# Patient Record
Sex: MALE | Race: WHITE | Employment: STUDENT | URBAN - METROPOLITAN AREA
[De-identification: names, ages, dates, MRNs, and addresses within clinical notes are randomized per-mention and may not be internally consistent; named-entity substitution may affect disease eponyms.]

---

## 2020-09-11 ENCOUNTER — OFFICE VISIT (OUTPATIENT)
Dept: PSYCHOLOGY | Age: 20
End: 2020-09-11
Payer: COMMERCIAL

## 2020-09-11 PROCEDURE — 99203 OFFICE O/P NEW LOW 30 MIN: CPT | Performed by: PSYCHIATRY & NEUROLOGY

## 2020-09-11 RX ORDER — FLUVOXAMINE MALEATE 100 MG
100 TABLET ORAL NIGHTLY
Qty: 30 TABLET | Refills: 0 | Status: SHIPPED
Start: 2020-09-11 | End: 2020-09-25 | Stop reason: SDUPTHER

## 2020-09-11 RX ORDER — ARIPIPRAZOLE 5 MG/1
5 TABLET ORAL NIGHTLY
Qty: 30 TABLET | Refills: 2 | Status: SHIPPED
Start: 2020-09-11 | End: 2020-12-18 | Stop reason: SDUPTHER

## 2020-09-11 NOTE — PROGRESS NOTES
program at [employer] 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     SUBSTANCE ABUSE HISTORY: Denies. MENTAL STATUS EXAM: White male appears age. Pleasant, cooperative, forthcoming. Normal psychomotor activity, gait, strength, tone, eye contact. Mood euthymic. Affect flexible. Speech clear. Thoughts organized. Content future-oriented. Obsessions noted. No compulsions. No paranoia, delusions, hallucinations. No suicidal or homicidal ideations. Orientation, concentration, recent and remote memory are grossly intact. Fund of knowledge fair. Language use fair. Insight and judgment fair. MEDICATIONS:  Luvox 100 mg nightly (increasing)     Abilify 5 mg nightly (continuing)     ASSESSMENT:  OCD      Mood Disorder    PLAN: Support, reassurance and psycho-education provided. Optimize Luvox. Patient not interested in resuming psychotherapy at this time. Continue to monitor symptoms, side effects and response to medications. Adjust treatment accordingly. See back two weeks.        Signed:  Electronically signed by Nathalie Wilson MD on 9/11/2020 at 9:39 AM

## 2020-09-25 ENCOUNTER — OFFICE VISIT (OUTPATIENT)
Dept: PSYCHOLOGY | Age: 20
End: 2020-09-25

## 2020-09-25 PROCEDURE — 99212 OFFICE O/P EST SF 10 MIN: CPT | Performed by: PSYCHIATRY & NEUROLOGY

## 2020-09-25 RX ORDER — FLUVOXAMINE MALEATE 100 MG
100 TABLET ORAL NIGHTLY
Qty: 30 TABLET | Refills: 0 | Status: SHIPPED
Start: 2020-09-25 | End: 2020-10-26 | Stop reason: SDUPTHER

## 2020-10-26 ENCOUNTER — OFFICE VISIT (OUTPATIENT)
Dept: PSYCHOLOGY | Age: 20
End: 2020-10-26

## 2020-10-26 PROCEDURE — 99213 OFFICE O/P EST LOW 20 MIN: CPT | Performed by: PSYCHIATRY & NEUROLOGY

## 2020-10-26 RX ORDER — FLUVOXAMINE MALEATE 100 MG
100 TABLET ORAL NIGHTLY
Qty: 30 TABLET | Refills: 2 | Status: SHIPPED
Start: 2020-10-26 | End: 2020-12-18 | Stop reason: SDUPTHER

## 2020-10-26 NOTE — PROGRESS NOTES
PSYCHIATRY ATTENDING NOTE    S: Patient being seen at [facility] in follow-up for OCD and mood disorder. No medication changes made last appointment. Met with patient today to discuss progress with treatment. Krishna Still reports things are going well overall. 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 Reports OCD symptoms well controlled with Luvox. Mood has been stable. Functioning well. No side effects. Eating and sleeping well. No issues otherwise. MSE: Kaden Ghotra male appears age. Pleasant, cooperative, forthcoming. Normal psychomotor activity, gait, strength, tone, eye contact. Mood euthymic. Affect flexible. Speech clear. Thoughts organized. Content future-oriented. No paranoia, delusions, hallucinations. No suicidal or homicidal ideations. Orientation, concentration, recent and remote memory are grossly intact. Fund of knowledge fair. Language use fair. Insight and judgment fair. MEDICATIONS:  Luvox 100 mg nightly (continuing)                                      Abilify 5 mg nightly (continuing)    ASSESSMENT:  OCD     Mood Disorder    PLAN: Patient doing well. Continue current treatment. Support provided. See back 8 weeks.                            Electronically signed by Steffany Ge MD on 10/26/2020 at 9:01 AM

## 2020-12-18 ENCOUNTER — OFFICE VISIT (OUTPATIENT)
Dept: PSYCHOLOGY | Age: 20
End: 2020-12-18

## 2020-12-18 PROCEDURE — 99212 OFFICE O/P EST SF 10 MIN: CPT | Performed by: PSYCHIATRY & NEUROLOGY

## 2020-12-18 RX ORDER — FLUVOXAMINE MALEATE 100 MG
100 TABLET ORAL NIGHTLY
Qty: 30 TABLET | Refills: 2 | Status: SHIPPED
Start: 2020-12-18 | End: 2021-02-09 | Stop reason: SDUPTHER

## 2020-12-18 RX ORDER — ARIPIPRAZOLE 5 MG/1
5 TABLET ORAL NIGHTLY
Qty: 30 TABLET | Refills: 2 | Status: SHIPPED
Start: 2020-12-18 | End: 2021-03-08 | Stop reason: SDUPTHER

## 2020-12-18 NOTE — PROGRESS NOTES
PSYCHIATRY ATTENDING NOTE    S: Patient being seen at [facility] in follow-up for OCD and mood disorder. No medication changes made last appointment. Met with patient today to discuss progress with treatment. Stanislaw Samayoa reports things are going well overall. He finished the semester with all A's and one B. Reports OCD symptoms are well controlled. Mood has been stable. Functioning well. No side effects. Eating and sleeping well. No issues otherwise. MSE: Ricardo Foster male appears age. Pleasant, cooperative, forthcoming. Normal psychomotor activity, gait, strength, tone, eye contact. Mood euthymic. Affect flexible. Speech clear. Thoughts organized. Content future-oriented. No paranoia, delusions, hallucinations. No suicidal or homicidal ideations. Orientation, concentration, recent and remote memory are grossly intact. Fund of knowledge fair. Language use fair. Insight and judgment fair. MEDICATIONS:  Luvox 100 mg nightly (continuing)                                      Abilify 5 mg nightly (continuing)    ASSESSMENT:  OCD     Mood Disorder    PLAN: Patient doing well. Continue current treatment. Support provided. See back 12 weeks.                            Electronically signed by Ryan Orellana MD on 12/18/2020 at 9:01 AM

## 2021-02-09 RX ORDER — FLUVOXAMINE MALEATE 100 MG
100 TABLET ORAL NIGHTLY
Qty: 30 TABLET | Refills: 2 | Status: SHIPPED
Start: 2021-02-09 | End: 2021-03-08 | Stop reason: SDUPTHER

## 2021-02-09 RX ORDER — FLUVOXAMINE MALEATE 100 MG
TABLET ORAL
COMMUNITY
Start: 2020-10-26 | End: 2021-02-09 | Stop reason: SDUPTHER

## 2021-03-08 ENCOUNTER — OFFICE VISIT (OUTPATIENT)
Dept: PSYCHOLOGY | Age: 21
End: 2021-03-08

## 2021-03-08 DIAGNOSIS — F42.9 OBSESSIVE-COMPULSIVE DISORDER, UNSPECIFIED TYPE: Primary | ICD-10-CM

## 2021-03-08 PROCEDURE — 99212 OFFICE O/P EST SF 10 MIN: CPT | Performed by: PSYCHIATRY & NEUROLOGY

## 2021-03-08 RX ORDER — ARIPIPRAZOLE 5 MG/1
5 TABLET ORAL NIGHTLY
Qty: 30 TABLET | Refills: 2 | Status: SHIPPED
Start: 2021-03-08 | End: 2021-06-25 | Stop reason: SDUPTHER

## 2021-03-08 RX ORDER — FLUVOXAMINE MALEATE 100 MG
100 TABLET ORAL NIGHTLY
Qty: 30 TABLET | Refills: 2 | Status: SHIPPED
Start: 2021-03-08 | End: 2021-06-23 | Stop reason: SDUPTHER

## 2021-03-08 NOTE — PROGRESS NOTES
PSYCHIATRY ATTENDING NOTE    S: Patient being seen at [facility] in follow-up for OCD and mood disorder. No medication changes made last appointment. Met with patient today to discuss progress with treatment. Mariluz Mederos reports he is doing very well. 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 His classes this semester are going well. Reports OCD symptoms are well controlled. Mood has been stable. Tolerating medications without incident. Eating and sleeping well. No issues otherwise. MSE: Shanw male appears age. Pleasant, cooperative, forthcoming. Normal psychomotor activity, gait, strength, tone, eye contact. Mood euthymic. Affect flexible. Speech clear. Thoughts organized. Content future-oriented. No paranoia, delusions, hallucinations. No suicidal or homicidal ideations. Orientation, concentration, recent and remote memory are grossly intact. Fund of knowledge fair. Language use fair. Insight and judgment fair. MEDICATIONS:  Luvox 100 mg nightly (continuing)                                      Abilify 5 mg nightly (continuing)    ASSESSMENT:  OCD     Mood Disorder    PLAN: Continue same treatment. See back 3 months.                           Electronically signed by Ryan Zhang MD on 3/8/2021 at 9:06 AM

## 2021-06-22 RX ORDER — FLUVOXAMINE MALEATE 100 MG
100 TABLET ORAL NIGHTLY
Qty: 30 TABLET | Refills: 0 | OUTPATIENT
Start: 2021-06-22

## 2021-06-22 NOTE — TELEPHONE ENCOUNTER
Patient has been out of his medication for the past 2 days and is in need of refill. Patient has follow up appointment on Friday, June 25.

## 2021-06-23 RX ORDER — FLUVOXAMINE MALEATE 100 MG
100 TABLET ORAL NIGHTLY
Qty: 30 TABLET | Refills: 2 | Status: SHIPPED
Start: 2021-06-23 | End: 2021-09-27 | Stop reason: SDUPTHER

## 2021-06-25 ENCOUNTER — OFFICE VISIT (OUTPATIENT)
Dept: PSYCHOLOGY | Age: 21
End: 2021-06-25

## 2021-06-25 DIAGNOSIS — F42.9 OBSESSIVE-COMPULSIVE DISORDER, UNSPECIFIED TYPE: Primary | ICD-10-CM

## 2021-06-25 PROCEDURE — 99212 OFFICE O/P EST SF 10 MIN: CPT | Performed by: PSYCHIATRY & NEUROLOGY

## 2021-06-25 RX ORDER — ARIPIPRAZOLE 5 MG/1
5 TABLET ORAL NIGHTLY
Qty: 30 TABLET | Refills: 3 | Status: SHIPPED
Start: 2021-06-25 | End: 2021-09-27 | Stop reason: SDUPTHER

## 2021-06-25 NOTE — PROGRESS NOTES
PSYCHIATRY ATTENDING NOTE    S: Patient being seen at [facility] in follow-up for OCD and mood disorder. No medication changes made last appointment. Met with patient today to discuss progress with treatment. Lauren Welch reports he is doing very well. He finished last semester with two B's and the rest A's. 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 Lauren Welch is looking forward to going to [place] in August for vacation. He is compliant with the medications and tolerating without incident. OCD symptoms are well controlled. Mood stable. No issues. MSE: [facility] male appears age. Pleasant, cooperative, forthcoming. Normal psychomotor activity, gait, strength, tone, eye contact. Mood euthymic. Affect flexible. Speech clear. Thoughts organized. Content future-oriented. No paranoia, delusions, hallucinations. No suicidal or homicidal ideations. Orientation, concentration, recent and remote memory are grossly intact. Fund of knowledge fair. Language use fair. Insight and judgment fair. MEDICATIONS:  Luvox 100 mg nightly (continuing)                                      Abilify 5 mg nightly (continuing)    ASSESSMENT:  OCD     Mood Disorder    PLAN: Continue same treatment. See back 3 months.                           Electronically signed by Derrick Santos MD on 6/25/2021 at 9:07 AM

## 2021-09-27 ENCOUNTER — OFFICE VISIT (OUTPATIENT)
Dept: PSYCHOLOGY | Age: 21
End: 2021-09-27

## 2021-09-27 DIAGNOSIS — F42.9 OBSESSIVE-COMPULSIVE DISORDER, UNSPECIFIED TYPE: Primary | ICD-10-CM

## 2021-09-27 PROCEDURE — 99213 OFFICE O/P EST LOW 20 MIN: CPT | Performed by: PSYCHIATRY & NEUROLOGY

## 2021-09-27 RX ORDER — ARIPIPRAZOLE 5 MG/1
5 TABLET ORAL NIGHTLY
Qty: 30 TABLET | Refills: 2 | Status: SHIPPED
Start: 2021-09-27 | End: 2021-11-17 | Stop reason: SDUPTHER

## 2021-09-27 RX ORDER — FLUVOXAMINE MALEATE 100 MG
150 TABLET ORAL NIGHTLY
Qty: 45 TABLET | Refills: 2 | Status: SHIPPED
Start: 2021-09-27 | End: 2021-12-20 | Stop reason: SDUPTHER

## 2021-09-27 NOTE — PROGRESS NOTES
PSYCHIATRY ATTENDING NOTE    S: Patient being seen at [facility] in follow-up for OCD and mood disorder. No medication changes made last appointment. Met with patient today to discuss progress with treatment. Franchesca Araujo reports he is doing pretty well overall. He does complain of more obsessive and ruminative thinking lately and inquires about medication increase. He is still working 30 hours a week at [employer] and going to school full-time. 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 Mood is still stable with Abilify and we discussed possibly taper off at some point. No acute issues or concerns otherwise. Grades are good. Eating and sleeping fine. MSE: Adilia Quan male appears age. Pleasant, cooperative, forthcoming. Normal psychomotor activity, gait, strength, tone, eye contact. Mood euthymic. Affect flexible. Speech clear. Thoughts organized. Content future-oriented. No paranoia, delusions, hallucinations. No suicidal or homicidal ideations. Orientation, concentration, recent and remote memory are grossly intact. Fund of knowledge fair. Language use fair. Insight and judgment fair. MEDICATIONS:  Luvox 150 mg nightly (increasing)                                      Abilify 5 mg nightly (continuing)    ASSESSMENT:  OCD     Mood Disorder    PLAN: Continue same treatment. Optimize Luvox. See back 3 months. Patient to call sooner if needed.                           Electronically signed by Elvis Jon MD on 9/27/2021 at 10:44 AM

## 2021-11-17 RX ORDER — ARIPIPRAZOLE 5 MG/1
5 TABLET ORAL NIGHTLY
Qty: 30 TABLET | Refills: 2 | Status: SHIPPED
Start: 2021-11-17 | End: 2021-12-20 | Stop reason: SDUPTHER

## 2021-12-20 ENCOUNTER — OFFICE VISIT (OUTPATIENT)
Dept: PSYCHOLOGY | Age: 21
End: 2021-12-20

## 2021-12-20 DIAGNOSIS — F42.9 OBSESSIVE-COMPULSIVE DISORDER, UNSPECIFIED TYPE: Primary | ICD-10-CM

## 2021-12-20 PROCEDURE — 99213 OFFICE O/P EST LOW 20 MIN: CPT | Performed by: PSYCHIATRY & NEUROLOGY

## 2021-12-20 RX ORDER — FLUVOXAMINE MALEATE 100 MG
150 TABLET ORAL NIGHTLY
Qty: 45 TABLET | Refills: 2 | Status: SHIPPED
Start: 2021-12-20 | End: 2022-04-15 | Stop reason: SDUPTHER

## 2021-12-20 RX ORDER — ARIPIPRAZOLE 5 MG/1
5 TABLET ORAL NIGHTLY
Qty: 30 TABLET | Refills: 2 | Status: SHIPPED
Start: 2021-12-20 | End: 2022-04-11 | Stop reason: SDUPTHER

## 2021-12-20 NOTE — PROGRESS NOTES
PSYCHIATRY ATTENDING NOTE    S: Patient being seen at [facility] in follow-up for OCD and mood disorder. Luvox was increased last appointment. Met with patient today to discuss progress with treatment. Jane Ashraf reports things are going well overall. He just finished fall semester with all A's and one B while continuing to work 25-30 hours a week at [employer]. 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 Jane Ashraf reports the medications have been helpful and he is tolerating them without incident. Mood stable. OCD symptoms stable. No acute issues or concerns otherwise. MSE: Alma Ferris male appears age. Pleasant, cooperative, forthcoming. Normal psychomotor activity, gait, strength, tone, eye contact. Mood euthymic. Affect flexible. Speech clear. Thoughts organized. Content future-oriented. No paranoia, delusions, hallucinations. No suicidal or homicidal ideations. Orientation, concentration, recent and remote memory are grossly intact. Fund of knowledge fair. Language use fair. Insight and judgment fair. MEDICATIONS:  Luvox 150 mg nightly (continuing)                                      Abilify 5 mg nightly (continuing)    ASSESSMENT:  OCD     Mood Disorder    PLAN: Continue same treatment. Patient stable at baseline. See back 3 months.                            Electronically signed by Jannie Mishra MD on 12/20/2021 at 10:37 AM

## 2022-04-11 RX ORDER — ARIPIPRAZOLE 5 MG/1
5 TABLET ORAL NIGHTLY
Qty: 30 TABLET | Refills: 0 | Status: SHIPPED
Start: 2022-04-11 | End: 2022-04-15 | Stop reason: SDUPTHER

## 2022-04-15 ENCOUNTER — OFFICE VISIT (OUTPATIENT)
Dept: PSYCHOLOGY | Age: 22
End: 2022-04-15

## 2022-04-15 DIAGNOSIS — F42.9 OBSESSIVE-COMPULSIVE DISORDER, UNSPECIFIED TYPE: Primary | ICD-10-CM

## 2022-04-15 PROCEDURE — 99213 OFFICE O/P EST LOW 20 MIN: CPT | Performed by: PSYCHIATRY & NEUROLOGY

## 2022-04-15 RX ORDER — FLUVOXAMINE MALEATE 100 MG
150 TABLET ORAL NIGHTLY
Qty: 135 TABLET | Refills: 1 | Status: SHIPPED | OUTPATIENT
Start: 2022-04-15

## 2022-04-15 RX ORDER — ARIPIPRAZOLE 5 MG/1
5 TABLET ORAL NIGHTLY
Qty: 90 TABLET | Refills: 1 | Status: SHIPPED | OUTPATIENT
Start: 2022-04-15

## 2022-04-15 NOTE — PROGRESS NOTES
PSYCHIATRY ATTENDING NOTE    S: Patient being seen at [facility] in follow-up for OCD and mood disorder. No medication changes made last appointment. Met with patient today to discuss progress with treatment. Jennifer Brownlee reports things are going well overall. 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 He is without any acute psychiatric complains and feels like this is the best overall he has felt in a long time. He is tolerating the medications without incident. We did discuss yearly testing of BMI/weight, blood glucose and lipids which he will address with primary care. Patient denies any abnormal involuntary movements with the Abilify. No issues or concerns otherwise. MSE: Antonio Rowell male appears age. Pleasant, cooperative, forthcoming. Normal psychomotor activity, gait, strength, tone, eye contact. Mood euthymic. Affect flexible. Speech clear. Thoughts organized. Content future-oriented. No paranoia, delusions, hallucinations. No suicidal or homicidal ideations. Orientation, concentration, recent and remote memory are grossly intact. Fund of knowledge fair. Language use fair. Insight and judgment fair. MEDICATIONS:  Luvox 150 mg nightly (continuing)                                      Abilify 5 mg nightly (continuing)    ASSESSMENT:  OCD     Mood Disorder    PLAN: Continue same treatment. Patient stable at baseline. See back 6 months. Patient to call sooner if needed.                            Electronically signed by Gt Mccollum MD on 4/15/2022 at 12:37 PM

## 2022-08-06 ENCOUNTER — HOSPITAL ENCOUNTER (EMERGENCY)
Age: 22
Discharge: HOME | End: 2022-08-07
Attending: EMERGENCY MEDICINE
Payer: COMMERCIAL

## 2022-08-06 DIAGNOSIS — T78.2XXA ANAPHYLAXIS, INITIAL ENCOUNTER: Primary | ICD-10-CM

## 2022-08-06 PROCEDURE — 99284 EMERGENCY DEPT VISIT MOD MDM: CPT

## 2022-08-06 PROCEDURE — 96361 HYDRATE IV INFUSION ADD-ON: CPT

## 2022-08-06 PROCEDURE — 2580000003 HC RX 258: Performed by: EMERGENCY MEDICINE

## 2022-08-06 PROCEDURE — 2500000003 HC RX 250 WO HCPCS: Performed by: EMERGENCY MEDICINE

## 2022-08-06 PROCEDURE — 6360000002 HC RX W HCPCS: Performed by: EMERGENCY MEDICINE

## 2022-08-06 PROCEDURE — 96372 THER/PROPH/DIAG INJ SC/IM: CPT

## 2022-08-06 PROCEDURE — 96375 TX/PRO/DX INJ NEW DRUG ADDON: CPT

## 2022-08-06 PROCEDURE — 96374 THER/PROPH/DIAG INJ IV PUSH: CPT

## 2022-08-06 PROCEDURE — A4216 STERILE WATER/SALINE, 10 ML: HCPCS | Performed by: EMERGENCY MEDICINE

## 2022-08-06 RX ORDER — 0.9 % SODIUM CHLORIDE 0.9 %
1000 INTRAVENOUS SOLUTION INTRAVENOUS ONCE
Status: COMPLETED | OUTPATIENT
Start: 2022-08-06 | End: 2022-08-06

## 2022-08-06 RX ORDER — DIPHENHYDRAMINE HYDROCHLORIDE 50 MG/ML
25 INJECTION INTRAMUSCULAR; INTRAVENOUS ONCE
Status: COMPLETED | OUTPATIENT
Start: 2022-08-06 | End: 2022-08-06

## 2022-08-06 RX ORDER — DEXAMETHASONE SODIUM PHOSPHATE 10 MG/ML
10 INJECTION INTRAMUSCULAR; INTRAVENOUS ONCE
Status: COMPLETED | OUTPATIENT
Start: 2022-08-06 | End: 2022-08-06

## 2022-08-06 RX ORDER — EPINEPHRINE 1 MG/ML
0.3 INJECTION, SOLUTION, CONCENTRATE INTRAVENOUS ONCE
Status: COMPLETED | OUTPATIENT
Start: 2022-08-06 | End: 2022-08-06

## 2022-08-06 RX ADMIN — DIPHENHYDRAMINE HYDROCHLORIDE 25 MG: 50 INJECTION, SOLUTION INTRAMUSCULAR; INTRAVENOUS at 21:40

## 2022-08-06 RX ADMIN — EPINEPHRINE 0.3 MG: 1 INJECTION, SOLUTION, CONCENTRATE INTRAVENOUS at 21:39

## 2022-08-06 RX ADMIN — SODIUM CHLORIDE 1000 ML: 9 INJECTION, SOLUTION INTRAVENOUS at 21:40

## 2022-08-06 RX ADMIN — FAMOTIDINE 20 MG: 10 INJECTION, SOLUTION INTRAVENOUS at 21:40

## 2022-08-06 RX ADMIN — DEXAMETHASONE SODIUM PHOSPHATE 10 MG: 10 INJECTION INTRAMUSCULAR; INTRAVENOUS at 21:40

## 2022-08-06 ASSESSMENT — PAIN - FUNCTIONAL ASSESSMENT: PAIN_FUNCTIONAL_ASSESSMENT: NONE - DENIES PAIN

## 2022-08-07 VITALS
BODY MASS INDEX: 42.38 KG/M2 | HEIGHT: 67 IN | RESPIRATION RATE: 18 BRPM | OXYGEN SATURATION: 97 % | HEART RATE: 81 BPM | SYSTOLIC BLOOD PRESSURE: 138 MMHG | TEMPERATURE: 99.2 F | DIASTOLIC BLOOD PRESSURE: 68 MMHG | WEIGHT: 270 LBS

## 2022-08-07 RX ORDER — CETIRIZINE HYDROCHLORIDE 10 MG/1
10 TABLET ORAL DAILY
Qty: 7 TABLET | Refills: 0 | Status: SHIPPED | OUTPATIENT
Start: 2022-08-07 | End: 2022-08-14

## 2022-08-07 RX ORDER — FAMOTIDINE 20 MG/1
20 TABLET, FILM COATED ORAL 2 TIMES DAILY
Qty: 14 TABLET | Refills: 0 | Status: SHIPPED | OUTPATIENT
Start: 2022-08-07 | End: 2022-08-14

## 2022-08-07 RX ORDER — PREDNISONE 20 MG/1
40 TABLET ORAL DAILY
Qty: 10 TABLET | Refills: 0 | Status: SHIPPED | OUTPATIENT
Start: 2022-08-07 | End: 2022-08-12

## 2022-08-07 RX ORDER — EPINEPHRINE 0.3 MG/.3ML
0.3 INJECTION SUBCUTANEOUS ONCE
Qty: 0.3 ML | Refills: 0 | Status: SHIPPED | OUTPATIENT
Start: 2022-08-07 | End: 2022-08-07

## 2022-08-07 ASSESSMENT — PAIN - FUNCTIONAL ASSESSMENT: PAIN_FUNCTIONAL_ASSESSMENT: NONE - DENIES PAIN

## 2022-08-07 NOTE — ED PROVIDER NOTES
ED PROVIDER NOTE    Chief Complaint   Patient presents with    Allergic Reaction     Pt ate a pistachio, known allergy to cashews. Benadryl given about 30 minutes prior to arrival           HPI:  8/6/22,   Time: 9:19 PM EDT       Sage Solomon is a 24 y.o. male presenting to the ED for allergic reaction. Acute onset 1 hour prior to arrival, persistent since onset, moderate in severity, no aggravating/alleviating factors. Has known hx of cashew allergy and 1 hour ago had a pistachio for the first time. Subsequently developed throat swelling, shortness of breath, nausea, and vomiting. No drooling or stridor. Does note some change in his voice and feels like his throat is swollen. Took benadryl PO prior to arrival.    Review of Systems:     Review of Systems   Constitutional:  Negative for appetite change, chills and fever. HENT:  Positive for facial swelling and voice change. Negative for congestion, drooling, rhinorrhea and trouble swallowing. Eyes:  Negative for visual disturbance. Respiratory:  Positive for chest tightness and shortness of breath. Negative for cough. Cardiovascular:  Negative for chest pain and leg swelling. Gastrointestinal:  Positive for vomiting. Negative for abdominal pain, blood in stool, diarrhea and nausea. Genitourinary:  Negative for decreased urine volume, difficulty urinating, dysuria, frequency, hematuria and urgency. Musculoskeletal:  Negative for myalgias, neck pain and neck stiffness. Skin:  Negative for color change and rash. Neurological:  Negative for dizziness, syncope, weakness, light-headedness, numbness and headaches.       --------------------------------------------- PAST HISTORY ---------------------------------------------  Past Medical History:   No past medical history on file. Past Surgical History:   No past surgical history on file.     Social History:   Social History     Socioeconomic History    Marital status: Single       Family History: No family history on file. The patients home medications have been reviewed. Allergies:   Not on File        ---------------------------------------------------PHYSICAL EXAM--------------------------------------    BP (!) 174/74   Pulse (!) 116   Temp 99.2 °F (37.3 °C) (Tympanic)   Resp 20   SpO2 97%     Physical Exam  Vitals and nursing note reviewed. Constitutional:       General: He is not in acute distress. Appearance: He is not toxic-appearing. HENT:      Mouth/Throat:      Mouth: Mucous membranes are moist.      Comments: Uvula and posterior oropharynx appear edematous. Muffled voice. No drooling or stridor. Tolerating oral secretions. Speaking in full sentences. Eyes:      General: No scleral icterus. Extraocular Movements: Extraocular movements intact. Pupils: Pupils are equal, round, and reactive to light. Cardiovascular:      Rate and Rhythm: Regular rhythm. Tachycardia present. Pulses: Normal pulses. Heart sounds: Normal heart sounds. No murmur heard. Pulmonary:      Effort: Pulmonary effort is normal. No respiratory distress. Breath sounds: Normal breath sounds. No wheezing or rales. Abdominal:      General: There is no distension. Palpations: Abdomen is soft. Tenderness: There is no abdominal tenderness. There is no guarding or rebound. Musculoskeletal:         General: No swelling or tenderness. Normal range of motion. Cervical back: Normal range of motion and neck supple. No rigidity. No muscular tenderness. Skin:     General: Skin is warm and dry. Comments: Facial flushing. No urticaria   Neurological:      Mental Status: He is alert and oriented to person, place, and time. Comments: Moves all extremities with appropriate strength. Sensation grossly intact in all extremities.         ------------------------- NURSING NOTES AND VITALS REVIEWED ---------------------------   The nursing notes within the ED encounter and vital signs as below have been reviewed by myself. BP (!) 174/74   Pulse (!) 116   Temp 99.2 °F (37.3 °C) (Tympanic)   Resp 20   SpO2 97%   Oxygen Saturation Interpretation: Normal    The patients available past medical records and past encounters were reviewed. ------------------------------ ED COURSE/MEDICAL DECISION MAKING----------------------  Medications   diphenhydrAMINE (BENADRYL) 50 MG/ML injection (has no administration in time range)   0.9 % sodium chloride bolus (has no administration in time range)   EPINEPHrine PF 1 MG/ML injection 0.3 mg (has no administration in time range)   diphenhydrAMINE (BENADRYL) injection 25 mg (has no administration in time range)   famotidine (PEPCID) 20 mg in sodium chloride (PF) 10 mL injection (has no administration in time range)   dexamethasone (DECADRON) injection 10 mg (has no administration in time range)   famotidine (PEPCID) 20 MG/2ML injection (has no administration in time range)       Critical Care: Please note that the withdrawal or failure to initiate urgent interventions for this patient would likely result in a life threatening deterioration or permanent disability. Accordingly this patient received 30 minutes of critical care time, excluding separately billable procedures. Counseling: The emergency provider has spoken with the patient and discussed todays results, in addition to providing specific details for the plan of care and counseling regarding the diagnosis and prognosis. Questions are answered at this time and they are agreeable with the plan. ED Course/Medical Decision Makin y.o. male here with allergic reaction to pistachio. On arrival tachycardic with facial flushing and posterior oropharyngeal edema. Treated w/ IM epinephrine and IV benadryl, pepcid, decadron, and IV fluids. Observed for 3 hours. On reevaluation symptoms resolved, no oropharyngeal edema or dysphonia.  Voice is back to normal.  After discussion of findings and return precautions, patient agrees with plan for discharge and outpatient follow up with PCP. Rx prednisone, pepcid, cetirizine, epipen.       --------------------------------- IMPRESSION AND DISPOSITION ---------------------------------    IMPRESSION  1. Anaphylaxis, initial encounter        DISPOSITION  Disposition: Discharge to home  Patient condition is good     NOTE: This report was transcribed using voice recognition software.  Every effort was made to ensure accuracy; however, inadvertent computerized transcription errors may be present    Purnima Joe MD  Attending Emergency Physician          Purnima Joe MD  08/07/22 [id number]

## 2022-08-07 NOTE — DISCHARGE INSTRUCTIONS
Return to the ER if you have difficulty breathing, speaking, or swallowing, vomiting, unable to keep down food or drink, swelling in your tongue or throat, or any other new or concerning symptoms.

## 2022-09-01 ENCOUNTER — NURSE ONLY (OUTPATIENT)
Dept: PRIMARY CARE CLINIC | Age: 22
End: 2022-09-01

## 2022-09-01 DIAGNOSIS — Z01.84 IMMUNITY STATUS TESTING: Primary | ICD-10-CM

## 2022-09-01 DIAGNOSIS — Z01.84 IMMUNITY STATUS TESTING: ICD-10-CM

## 2022-09-01 PROCEDURE — 36415 COLL VENOUS BLD VENIPUNCTURE: CPT | Performed by: FAMILY MEDICINE

## 2022-09-02 LAB
MEASLES IMMUNE (IGG): NORMAL
MUMPS AB IGG: NORMAL
RUBELLA ANTIBODY IGG: NORMAL
VARICELLA-ZOSTER VIRUS AB, IGG: NORMAL

## 2022-09-04 LAB
HBV SURFACE AB TITR SER: NORMAL {TITER}
MISCELLANEOUS LAB TEST RESULT: NORMAL

## 2022-09-06 ENCOUNTER — NURSE ONLY (OUTPATIENT)
Dept: PRIMARY CARE CLINIC | Age: 22
End: 2022-09-06

## 2022-09-06 DIAGNOSIS — Z11.1 PPD SCREENING TEST: Primary | ICD-10-CM

## 2022-09-06 PROCEDURE — 86580 TB INTRADERMAL TEST: CPT | Performed by: NURSE PRACTITIONER

## 2022-09-08 ENCOUNTER — NURSE ONLY (OUTPATIENT)
Dept: PRIMARY CARE CLINIC | Age: 22
End: 2022-09-08

## 2022-09-08 LAB
INDURATION: NORMAL
TB SKIN TEST: NORMAL

## 2022-11-23 RX ORDER — FLUVOXAMINE MALEATE 100 MG
150 TABLET ORAL NIGHTLY
Qty: 135 TABLET | Refills: 0 | Status: SHIPPED | OUTPATIENT
Start: 2022-11-23

## 2022-12-22 ENCOUNTER — APPOINTMENT (OUTPATIENT)
Dept: GENERAL RADIOLOGY | Age: 22
End: 2022-12-22
Payer: COMMERCIAL

## 2022-12-22 ENCOUNTER — HOSPITAL ENCOUNTER (EMERGENCY)
Age: 22
Discharge: HOME | End: 2022-12-22
Payer: COMMERCIAL

## 2022-12-22 VITALS
OXYGEN SATURATION: 98 % | HEIGHT: 67 IN | DIASTOLIC BLOOD PRESSURE: 90 MMHG | HEART RATE: 85 BPM | BODY MASS INDEX: 42.38 KG/M2 | RESPIRATION RATE: 16 BRPM | WEIGHT: 270 LBS | SYSTOLIC BLOOD PRESSURE: 156 MMHG | TEMPERATURE: 98.2 F

## 2022-12-22 DIAGNOSIS — S90.32XA CONTUSION OF FOOT OR HEEL, LEFT, INITIAL ENCOUNTER: Primary | ICD-10-CM

## 2022-12-22 PROCEDURE — 99283 EMERGENCY DEPT VISIT LOW MDM: CPT

## 2022-12-22 PROCEDURE — 73650 X-RAY EXAM OF HEEL: CPT

## 2022-12-22 PROCEDURE — 73630 X-RAY EXAM OF FOOT: CPT

## 2022-12-22 ASSESSMENT — PAIN DESCRIPTION - PAIN TYPE: TYPE: ACUTE PAIN

## 2022-12-22 ASSESSMENT — PAIN - FUNCTIONAL ASSESSMENT: PAIN_FUNCTIONAL_ASSESSMENT: 0-10

## 2022-12-22 ASSESSMENT — PAIN DESCRIPTION - FREQUENCY: FREQUENCY: CONTINUOUS

## 2022-12-22 ASSESSMENT — PAIN DESCRIPTION - ORIENTATION: ORIENTATION: LEFT

## 2022-12-22 ASSESSMENT — PAIN DESCRIPTION - LOCATION: LOCATION: FOOT

## 2022-12-22 ASSESSMENT — PAIN DESCRIPTION - ONSET: ONSET: SUDDEN

## 2022-12-22 ASSESSMENT — PAIN DESCRIPTION - DESCRIPTORS: DESCRIPTORS: SHARP

## 2022-12-22 ASSESSMENT — PAIN SCALES - GENERAL: PAINLEVEL_OUTOF10: 8

## 2022-12-22 NOTE — ED PROVIDER NOTES
[facility]  Department of Emergency Medicine   ED  Encounter Note  Admit Date/RoomTime: 2022  4:57 PM  ED Room:   NAME: Jay Lambert  : 2000  MRN: [id number]     Chief Complaint:  Foot Pain (Left heel pain from fall last night. )    HISTORY OF PRESENT ILLNESS        Jay Lambert is a 25 y.o. male who presents to the ED with a complaint of left heel pain. Patient states he was stepping into a hot tub last night when his foot slipped and he came down on the left heel. States ever since then he has had pain in the left heel. Pain when he goes to stand or take a step. He denies any ankle pain. Denies any pain of the left leg. Rates the discomfort an 8 out of 10. Has not taken any medication for this complaint. ROS   Pertinent positives and negatives are stated within HPI, all other systems reviewed and are negative. Past Medical History:  has no past medical history on file. Surgical History:  has no past surgical history on file. Social History:  reports that he has never smoked. He has never used smokeless tobacco. He reports current alcohol use. He reports that he does not use drugs. Family History: family history is not on file. Allergies: Cashews [macadamia nut oil] and Pistachio nut (diagnostic)    PHYSICAL EXAM   Oxygen Saturation Interpretation: Normal on room air analysis. ED Triage Vitals   BP Temp Temp Source Heart Rate Resp SpO2 Height Weight   22 1602 22 1602 22 1602 22 1602 22 1602 22 1602 22 1633 22 1633   (!) 156/90 98.2 °F (36.8 °C) Oral (!) 101 16 99 % 5' 7\" (1.702 m) 270 lb (122.5 kg)         General:  NAD. Alert and Oriented. Well-appearing. Skin:  Warm, dry. No rashes. Head:  Normocephalic. Atraumatic. Eyes:  EOMI. Conjunctiva normal.  ENT:  Oral mucosa moist.  Airway patent. Neck:  Supple. Normal ROM. Respiratory:  No respiratory distress. No labored breathing. Lungs clear without rales, rhonchi or wheezing. Cardiovascular:  Regular rate. No Murmur. No peripheral edema. Extremities warm and good color. Extremities:    He is tender to palpation around the left heel. There is no swelling. Negative ecchymosis. No evidence of wound or open skin. Remainder of the left foot is nontender to palpation. Left ankle is nontender to palpation. Plantarflexion and dorsiflexion is intact. 2+ left dorsalis pedis pulse. Back:  Normal ROM. Nontender to palpation. Neuro:  Alert and Oriented to person, place, time and situation. Normal LOC. Moves all extremities. Speech fluent. Psych:  Calm and Cooperative. Normal thought process. Normal judgement. Lab / Imaging Results   (All laboratory and radiology results have been personally reviewed by myself)  Labs:  No results found for this visit on 12/22/22. Imaging: All Radiology results interpreted by Radiologist unless otherwise noted. XR FOOT LEFT (MIN 3 VIEWS)   Final Result   No acute osseous abnormality. XR CALCANEUS LEFT (MIN 2 VIEWS)   Final Result   No acute osseous abnormality. ED Course / Medical Decision Making   Medications - No data to display     Re-examination:  12/22/22       Time:   Patients condition . Consult(s):   None    Procedure(s):   None    MDM:   70-year-old male with a complaint of left heel injury. I did do specific calcaneus x-rays in addition to foot x-ray and both are negative for fracture. Discussed use of postop shoe for the padding along the plantar side with patient and he declined to use. Recommended anti-inflammatories, rest and elevating his leg to give it a break anytime he could. I will refer him to podiatrist in case this continues to be a problem.     Plan of Care/Counseling:  Prasanna Ortiz reviewed today's visit with the patient in addition to providing specific details for the plan of care and counseling regarding the diagnosis and prognosis. Questions are answered at this time and are agreeable with the plan. ASSESSMENT     1. Contusion of foot or heel, left, initial encounter      PLAN   Discharged home. Patient condition is good    New Medications     New Prescriptions    No medications on file     Electronically signed by TYREL Myers   DD: 12/22/22  **This report was transcribed using voice recognition software. Every effort was made to ensure accuracy; however, inadvertent computerized transcription errors may be present.   END OF ED PROVIDER NOTE       Prasanna Myers  12/22/22 0259

## 2023-03-06 RX ORDER — FLUVOXAMINE MALEATE 100 MG
TABLET ORAL
Qty: 135 TABLET | Refills: 0 | OUTPATIENT
Start: 2023-03-06

## 2023-03-06 RX ORDER — FLUVOXAMINE MALEATE 100 MG
150 TABLET ORAL NIGHTLY
Qty: 45 TABLET | Refills: 0 | Status: SHIPPED | OUTPATIENT
Start: 2023-03-06

## 2023-03-06 RX ORDER — ARIPIPRAZOLE 5 MG/1
5 TABLET ORAL NIGHTLY
Qty: 30 TABLET | Refills: 0 | Status: SHIPPED | OUTPATIENT
Start: 2023-03-06

## 2023-04-10 RX ORDER — ARIPIPRAZOLE 5 MG/1
5 TABLET ORAL NIGHTLY
Qty: 30 TABLET | Refills: 0 | OUTPATIENT
Start: 2023-04-10

## 2023-04-10 RX ORDER — FLUVOXAMINE MALEATE 100 MG
150 TABLET ORAL NIGHTLY
Qty: 45 TABLET | Refills: 0 | OUTPATIENT
Start: 2023-04-10

## 2023-04-18 ENCOUNTER — OFFICE VISIT (OUTPATIENT)
Dept: PSYCHOLOGY | Age: 23
End: 2023-04-18

## 2023-04-18 DIAGNOSIS — Z00.00 ROUTINE ADULT HEALTH MAINTENANCE: Primary | ICD-10-CM

## 2023-04-18 RX ORDER — FLUVOXAMINE MALEATE 100 MG
150 TABLET ORAL NIGHTLY
Qty: 135 TABLET | Refills: 2 | Status: SHIPPED | OUTPATIENT
Start: 2023-04-18

## 2023-04-18 RX ORDER — ARIPIPRAZOLE 5 MG/1
5 TABLET ORAL NIGHTLY
Qty: 90 TABLET | Refills: 2 | Status: SHIPPED | OUTPATIENT
Start: 2023-04-18

## 2023-04-18 NOTE — PROGRESS NOTES
PSYCHIATRY ATTENDING NOTE    S: Patient being seen at [facility] in follow-up for OCD and mood disorder. No medication changes made last appointment. Met with patient today to discuss progress with treatment. Switched to psychology major  Anticipates graduation in spring '24  Ran out of medication about a week ago - says did not receive message that he needed to be seen (last appointment was April 2022)  Feels medications working fine, no issues  Discussed routine blood work including fasting lipids and blood sugar due to being on Abilify   No issues or concerns otherwise    MSE: White male appears age. Pleasant, cooperative, forthcoming. Normal psychomotor activity, gait, strength, tone, eye contact. Mood euthymic. Affect flexible. Speech clear. Thoughts organized. Content future-oriented. No paranoia, delusions, hallucinations. No suicidal or homicidal ideations. Orientation, concentration, recent and remote memory are grossly intact. Fund of knowledge fair. Language use fair. Insight and judgment fair. MEDICATIONS:  Luvox 150 mg nightly (continuing)                                      Abilify 5 mg nightly (continuing)    ASSESSMENT:  OCD     Mood Disorder    PLAN: Continue same treatment. Patient stable at baseline. Check CBC, BMP, FLP, TSH. See back in December.                           Electronically signed by Jimmie Rock MD on 4/18/2023 at 8:49 AM

## 2024-02-23 DIAGNOSIS — F42.9 OBSESSIVE-COMPULSIVE DISORDER, UNSPECIFIED TYPE: Primary | ICD-10-CM

## 2024-02-25 RX ORDER — ARIPIPRAZOLE 5 MG/1
5 TABLET ORAL NIGHTLY
Qty: 90 TABLET | Refills: 0 | Status: SHIPPED | OUTPATIENT
Start: 2024-02-25

## 2024-02-25 RX ORDER — FLUVOXAMINE MALEATE 100 MG
150 TABLET ORAL NIGHTLY
Qty: 135 TABLET | Refills: 0 | Status: SHIPPED | OUTPATIENT
Start: 2024-02-25

## 2024-06-07 DIAGNOSIS — F42.9 OBSESSIVE-COMPULSIVE DISORDER, UNSPECIFIED TYPE: ICD-10-CM

## 2024-06-07 RX ORDER — FLUVOXAMINE MALEATE 100 MG
TABLET ORAL
Qty: 135 TABLET | Refills: 0 | OUTPATIENT
Start: 2024-06-07

## 2024-06-07 RX ORDER — ARIPIPRAZOLE 5 MG/1
5 TABLET ORAL NIGHTLY
Qty: 90 TABLET | Refills: 0 | OUTPATIENT
Start: 2024-06-07